# Patient Record
Sex: MALE
[De-identification: names, ages, dates, MRNs, and addresses within clinical notes are randomized per-mention and may not be internally consistent; named-entity substitution may affect disease eponyms.]

---

## 2019-02-14 ENCOUNTER — TRANSCRIPTION ENCOUNTER (OUTPATIENT)
Age: 24
End: 2019-02-14

## 2020-09-27 ENCOUNTER — EMERGENCY (EMERGENCY)
Facility: HOSPITAL | Age: 25
LOS: 1 days | Discharge: HOME | End: 2020-09-27
Attending: EMERGENCY MEDICINE
Payer: COMMERCIAL

## 2020-09-27 VITALS
TEMPERATURE: 100 F | SYSTOLIC BLOOD PRESSURE: 143 MMHG | WEIGHT: 199.96 LBS | HEART RATE: 115 BPM | HEIGHT: 72 IN | RESPIRATION RATE: 18 BRPM | DIASTOLIC BLOOD PRESSURE: 83 MMHG

## 2020-09-27 VITALS — TEMPERATURE: 100 F

## 2020-09-27 PROCEDURE — 12001 RPR S/N/AX/GEN/TRNK 2.5CM/<: CPT

## 2020-09-27 PROCEDURE — 99284 EMERGENCY DEPT VISIT MOD MDM: CPT | Mod: 25

## 2020-09-27 PROCEDURE — 90471 IMMUNIZATION ADMIN: CPT

## 2020-09-27 PROCEDURE — 12001 RPR S/N/AX/GEN/TRNK 2.5CM/<: CPT | Mod: F5

## 2020-09-27 PROCEDURE — 90715 TDAP VACCINE 7 YRS/> IM: CPT

## 2020-09-27 PROCEDURE — 96374 THER/PROPH/DIAG INJ IV PUSH: CPT | Mod: XU

## 2020-09-27 RX ORDER — SODIUM CHLORIDE 9 MG/ML
1000 INJECTION INTRAMUSCULAR; INTRAVENOUS; SUBCUTANEOUS ONCE
Refills: 0 | Status: COMPLETED | OUTPATIENT
Start: 2020-09-27 | End: 2020-09-27

## 2020-09-27 RX ORDER — TETANUS TOXOID, REDUCED DIPHTHERIA TOXOID AND ACELLULAR PERTUSSIS VACCINE, ADSORBED 5; 2.5; 8; 8; 2.5 [IU]/.5ML; [IU]/.5ML; UG/.5ML; UG/.5ML; UG/.5ML
0.5 SUSPENSION INTRAMUSCULAR ONCE
Refills: 0 | Status: COMPLETED | OUTPATIENT
Start: 2020-09-27 | End: 2020-09-27

## 2020-09-27 RX ORDER — CEFAZOLIN SODIUM 1 G
1000 VIAL (EA) INJECTION ONCE
Refills: 0 | Status: COMPLETED | OUTPATIENT
Start: 2020-09-27 | End: 2020-09-27

## 2020-09-27 RX ORDER — CEPHALEXIN 500 MG
1 CAPSULE ORAL
Qty: 28 | Refills: 0
Start: 2020-09-27 | End: 2020-10-03

## 2020-09-27 RX ADMIN — SODIUM CHLORIDE 1000 MILLILITER(S): 9 INJECTION INTRAMUSCULAR; INTRAVENOUS; SUBCUTANEOUS at 13:14

## 2020-09-27 RX ADMIN — Medication 100 MILLIGRAM(S): at 12:38

## 2020-09-27 RX ADMIN — TETANUS TOXOID, REDUCED DIPHTHERIA TOXOID AND ACELLULAR PERTUSSIS VACCINE, ADSORBED 0.5 MILLILITER(S): 5; 2.5; 8; 8; 2.5 SUSPENSION INTRAMUSCULAR at 12:39

## 2020-09-27 NOTE — ED STATDOCS - ATTENDING CONTRIBUTION TO CARE
I, Onelia Carrera, performed a face to face bedside interview with this patient regarding history of present illness, review of symptoms and relevant past medical, social and family history.  I completed an independent physical examination. Medical decision making, follow-up on ordered tests (ie labs, radiologic studies) and re-evaluation of the patient's status has been communicated to the ACP.  Disposition of the patient will be based on test outcome and response to ED interventions.     24yo M with injury to rt hand punching through glass, +significant bleeding. no bleeding disorder. unknown last tdap. able to move thumb. no numbness.     Rt thumb with 4cm stellate laceration over dorsal MCP region w/ active bleeding, 1cm laceration over PIP without bleeding, 4cm over palmar aspect of PIP and proximal. no gross tendon involvement on inspection, superficial arterial injury     bleeding controlled with absorbable sutures, wound explored in bloodless field, glass shard removed, multiple flaps aligned. no debridement.     pt neurovasc intact on exam, tendon intact, superficial arteriole injury no deep vascular injury- good cap refill. will repair. tdap. Abx. re-examine finger

## 2020-09-27 NOTE — ED PROVIDER NOTE - CARE PROVIDER_API CALL
Morgan Martinez  PLASTIC SURGERY  [address] [place] [address], [address]  [place] [address]  Phone: [phone]  Fax: [phone]  Follow Up Time: 1-3 Days

## 2020-09-27 NOTE — ED PROCEDURE NOTE - PROCEDURE ADDITIONAL DETAILS
Suture Tray # 1 ( Total of 2 trays used for this patient)  Prolene 3.0 # 2   Vicryl 5.0 # 2
Suture Tray #1   Prolene 4.0 # 1   Prolene 5.0# 2

## 2020-09-27 NOTE — ED PROVIDER NOTE - PROGRESS NOTE DETAILS
Laceration Noted to right thumb. Laceration cleaned with normal saline and betadine. Lidocaine 1% infused into area. laceration closed with Sutures. Pt tolerated procedure well. Instructions as follows. No water to site . No Vaseline or lotion to site. F/U with Hand Surgery as discussed.

## 2020-09-27 NOTE — ED ADULT NURSE NOTE - CHIEF COMPLAINT QUOTE
cut hand on glass cut " tendon" between thumb & index, pt + bleeding noted, diaphoretic,  pt stated he pushed down on glass too hard

## 2020-09-27 NOTE — ED PROVIDER NOTE - PATIENT PORTAL LINK FT
You can access the FollowMyHealth Patient Portal offered by [facility] by registering at the following website: [email or web address]. By joining [facility]’s FollowMyHealth portal, you will also be able to view your health information using other applications (apps) compatible with our system.

## 2020-09-27 NOTE — ED ADULT NURSE NOTE - NSIMPLEMENTINTERV_GEN_ALL_ED
Implemented All Universal Safety Interventions:  [place] to call system. Call bell, personal items and telephone within reach. Instruct patient to call for assistance. Room bathroom lighting operational. Non-slip footwear when patient is off stretcher. Physically safe environment: no spills, clutter or unnecessary equipment. Stretcher in lowest position, wheels locked, appropriate side rails in place.

## 2020-09-27 NOTE — ED PROVIDER NOTE - OBJECTIVE STATEMENT
25 yr old M presented to ED with right hand laceration x 15mis ago. Pt states that he punched his car window and the grass shattered cutting his hand. Pt states that the glass cut his right thumb. Pt admits to pain , extensive bleeding but no weakness. Pt also denies any numbness or paraesthesia to his finger. Pt denies any knowledge about his last TD shot. Pt denies nay significant past medical or surgical illness. Pt also denies any allergies to medication or food.

## 2020-09-27 NOTE — ED PROVIDER NOTE - CLINICAL SUMMARY MEDICAL DECISION MAKING FREE TEXT BOX
25 yr old M presented to ED with right hand laceration x 15mis ago. Pt states that he punched his car window and the grass shattered cutting his hand. Pt states that the glass cut his right thumb. Pt admits to pain , extensive bleeding but no weakness. Examination + Normal ROM . Excessive from flinger. Normal sensation . No damage to nail bed. Laceration #1 2.5cm and Laceration #2 1.5 cm. Laceration closed with a total of 20 stitches. Pt also administered Td in ED and D/C in stable condition . F/U with Hand as discussed.

## 2020-09-27 NOTE — ED STATDOCS - PROGRESS NOTE DETAILS
discussed with Dr. Martinez will see in clinic tomorrow, re-examined; cap refill <2 normal perfusion, no bleeding, extension intact, slight weakness to flexion but also in significnat amount of pain. palced in splint with flexion po abx will dc -Slowey DO

## 2020-09-27 NOTE — ED ADULT TRIAGE NOTE - CHIEF COMPLAINT QUOTE
cut hand on glass cut " tendon" between thumb & index, pt + bleeding noted, diaphoretic, cut hand on glass cut " tendon" between thumb & index, pt + bleeding noted, diaphoretic,  pt stated he pushed down on glass too hard

## 2020-09-27 NOTE — ED PROCEDURE NOTE - CPROC ED POST PROC CARE GUIDE1
Verbal/written post procedure instructions were given to patient/caregiver./Instructed patient/caregiver to follow-up with primary care physician.
Verbal/written post procedure instructions were given to patient/caregiver./Instructed patient/caregiver to follow-up with primary care physician./Instructed patient/caregiver regarding signs and symptoms of infection.

## 2021-06-08 NOTE — ED PROVIDER NOTE - GASTROINTESTINAL, MLM
Here for f/u with Dr. Mendoza, symptoms improving, pain getting better (using one oxycodone per day now). Still using feeding tube (6 cans per day divided up 1-2-1-2), but also eating/drinking okay. Will see back in one month. [facility] , [place], used throughout encounter via speaker phone.    Abdomen soft, non-tender, no guarding.

## 2021-10-01 NOTE — ED ADULT NURSE NOTE - CAS ELECT INFOMATION PROVIDED
DC instructions
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\par Chest x-ray PA lateral September 30, 2021\par Normal cardiac size\par Uncoiled aorta\par No parenchymal infiltrates pleural effusions or dominant pulmonary nodules\par No gross interval change compared to prior serial chest x-rays\par \par EKG 9/30/21\par  atrial rhythm\par LVH\par \par CT CHEST 3/25/21 \par clear lungs \par CA Ca++\par \par Cardiac Cath 1/2018\par nl CA\par \par PFT June 25, 2019\par Spirometry normal\par 32% response to bronchodilator at small airways\par Lung volumes normal with total lung capacity 97% predicted.\par Diffusion normal 108% of predicted.\par Hemoglobin 14.9\par CPAP data compliance review through June 24, 2019\par Usage 90%\par Hours greater than 6\par AHI 4.0\par Auto CPAP settings 6-20 cm H2O\par Chest x-ray PA lateral June 25, 2019\par Cardiac size normal\par Clear lung fields without evidence of parenchymal infiltrates pleural effusions dominant pulmonary nodules\par No pneumothorax\par Mediastinum hilum normal\par No interval change compared to a chest x-ray dating back to December 7, 2017\par Blood draw\par Data review 6/26/2019\par CBC White blood count 6.79 hemoglobin 14.8 hematocrit 45.8 platelet count 227,000\par TFTs normal\par Hemoglobin A1c 5.6 with mean plasma glucose 114\par Hepatitis C titer negative\par Lipid profile\par Cholesterol 170 HDL 42  triglycerides 137\par PSA 0.91\par Vitamin D 28.5\par Serum electrolytes normal\par Liver function testing normal range\par DEJAH negative\par \par Blood Draw\par

## 2021-11-22 NOTE — ED STATDOCS - NSTIMEPROVIDERCAREINITIATE_GEN_ER
Pt Name and  verified. Pt informed and voiced understanding. Will come in today at [facility]. No further questions. 27-Sep-2020 11:15

## 2022-10-10 NOTE — ED PROVIDER NOTE - CHIEF COMPLAINT
The patient is a 25y Male complaining of lacerations. Bexarotene Counseling:  I discussed with the patient the risks of bexarotene including but not limited to hair loss, dry lips/skin/eyes, liver abnormalities, hyperlipidemia, pancreatitis, depression/suicidal ideation, photosensitivity, drug rash/allergic reactions, hypothyroidism, anemia, leukopenia, infection, cataracts, and teratogenicity.  Patient understands that they will need regular blood tests to check lipid profile, liver function tests, white blood cell count, thyroid function tests and pregnancy test if applicable.